# Patient Record
Sex: FEMALE | Race: BLACK OR AFRICAN AMERICAN | Employment: STUDENT | URBAN - METROPOLITAN AREA
[De-identification: names, ages, dates, MRNs, and addresses within clinical notes are randomized per-mention and may not be internally consistent; named-entity substitution may affect disease eponyms.]

---
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
## 2022-01-17 ENCOUNTER — IMMUNIZATION (OUTPATIENT)
Dept: LAB | Facility: HOSPITAL | Age: 22
End: 2022-01-17
Attending: EMERGENCY MEDICINE
Payer: COMMERCIAL

## 2022-01-17 DIAGNOSIS — Z23 NEED FOR VACCINATION: Primary | ICD-10-CM

## 2022-01-17 PROCEDURE — 0064A SARSCOV2 VAC 50MCG/0.25ML IM: CPT

## (undated) NOTE — LETTER
Date & Time: 4/15/2018, 1:01 PM  Patient: Brigette Later  Encounter Provider(s):    See, KRYSTINA Craft       To Whom It May Concern:    Kylee Leiva was seen and treated in our department on 4/15/2018.  She should not participate in gym/sports until cleared b

## (undated) NOTE — LETTER
[facility] IN [place] [id number] [address]  Dept: [phone]  Dept Fax: [phone]      December 14, 2017    Patient: Danisha Ro   Date of Visit: 12/14/2017       To Whom It May Concern:    Joey Prasad was seen and

## (undated) NOTE — LETTER
Date & Time: 8/28/2019, 10:41 PM  Patient: Leeroy Wright  Encounter Provider(s):    RUDDY Son       To Whom It May Concern:    Nakul Anne was seen and treated in our department on 8/28/2019.  She should not participate in gym/sports until [facility]